# Patient Record
Sex: MALE | Race: WHITE | NOT HISPANIC OR LATINO | URBAN - METROPOLITAN AREA
[De-identification: names, ages, dates, MRNs, and addresses within clinical notes are randomized per-mention and may not be internally consistent; named-entity substitution may affect disease eponyms.]

---

## 2017-01-20 ENCOUNTER — IMPORTED ENCOUNTER (OUTPATIENT)
Dept: URBAN - METROPOLITAN AREA CLINIC 1 | Facility: CLINIC | Age: 82
End: 2017-01-20

## 2017-01-20 PROBLEM — E11.3291: Noted: 2017-01-20

## 2017-01-20 PROBLEM — Z96.1: Noted: 2017-01-20

## 2017-01-20 PROBLEM — E11.9: Noted: 2017-01-20

## 2017-01-20 PROBLEM — H01.005: Noted: 2017-01-20

## 2017-01-20 PROBLEM — H04.123: Noted: 2017-01-20

## 2017-01-20 PROBLEM — H01.002: Noted: 2017-01-20

## 2017-01-20 PROBLEM — H35.3131: Noted: 2017-01-20

## 2017-01-20 PROBLEM — D31.32: Noted: 2017-01-20

## 2017-01-20 PROBLEM — H40.1131: Noted: 2017-01-20

## 2017-01-20 PROCEDURE — 92012 INTRM OPH EXAM EST PATIENT: CPT

## 2017-01-20 NOTE — PATIENT DISCUSSION
1.  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 Patient advised to be compliant with gtts. Condition was discussed with patient and patient understands. Will continue to monitor patient for any progression in condition. Patient was advised to call us with any problems questions or concerns. 2.  Blepharitis anterior type OU- Continue daily warm compresses and lid scrubs were recommended. 3. Dry Eyes OU- Stable. The continuation of artificial tears were recommended. 4.  Pseudophakia OU- Doing well.5. H/o ARMD OU early/dry6. DM Type II with Mild Nonproliferative OD7. H/o DM Type II without DR Grady Sever.  H/o Choroidal Nevus OS9. Return for an appointment for a 30/OCT in May with Dr. Julieta Dang.

## 2017-05-08 ENCOUNTER — IMPORTED ENCOUNTER (OUTPATIENT)
Dept: URBAN - METROPOLITAN AREA CLINIC 1 | Facility: CLINIC | Age: 82
End: 2017-05-08

## 2017-05-08 PROBLEM — E11.9: Noted: 2017-05-08

## 2017-05-08 PROBLEM — D31.32: Noted: 2017-05-08

## 2017-05-08 PROBLEM — E11.3291: Noted: 2017-05-08

## 2017-05-08 PROBLEM — H35.3131: Noted: 2017-05-08

## 2017-05-08 PROBLEM — H01.001: Noted: 2017-05-08

## 2017-05-08 PROBLEM — H01.004: Noted: 2017-05-08

## 2017-05-08 PROBLEM — H04.123: Noted: 2017-05-08

## 2017-05-08 PROBLEM — H40.1131: Noted: 2017-05-08

## 2017-05-08 PROBLEM — Z79.84: Noted: 2017-05-08

## 2017-05-08 PROCEDURE — 92014 COMPRE OPH EXAM EST PT 1/>: CPT

## 2017-05-08 PROCEDURE — 92133 CPTRZD OPH DX IMG PST SGM ON: CPT

## 2017-05-08 NOTE — PATIENT DISCUSSION
1.  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 Stressed the importance of strong glucose control. Advised of importance of at least yearly dilated examinations but to contact us immediately for any problems or concerns. 2.  DM Type II (Oral Meds) without sign of diabetic retinopathy and no blot heme on dilated retinal examination today OS No Macular Edema:  Discussed the pathophysiology of diabetes and its effect on the eye and risk of blindness. Stressed the importance of strong glucose control. Advised of importance of at least yearly dilated examinations but to contact us immediately for any problems or concerns. 3. ARMD OU Early/dry/stable. Importance of daily AREDS II study multivitamin and Amsler Grid checks discussed with patient. Patient to follow-up immediately with any new onset of decreased vision and/or metamorphopsia. 4. Choroidal Nevus OS: Appears Benign and stable. Observe for changes. 5. Blepharitis anterior type OU - Continue Daily warm compresses and lid scrubs were recommended. 6. Mild Open Angle Glaucoma OU (CD 0.9 OU) IOP stable on Latanoprost QHS OU. OCT shows no progression OU. Continue Latanoprost QHS OU. Compliance with meds. 7.  Dry Eyes OU - Continue ATs BID OU routinely. Letter to PCP Return for an appointment in 6 months 10 VF 24-2 OU with Dr. Kamala Ramirez.

## 2017-11-09 ENCOUNTER — IMPORTED ENCOUNTER (OUTPATIENT)
Dept: URBAN - METROPOLITAN AREA CLINIC 1 | Facility: CLINIC | Age: 82
End: 2017-11-09

## 2017-11-09 PROBLEM — H01.002: Noted: 2017-11-09

## 2017-11-09 PROBLEM — H01.005: Noted: 2017-11-09

## 2017-11-09 PROBLEM — H16.141: Noted: 2017-11-09

## 2017-11-09 PROBLEM — H40.1131: Noted: 2017-11-09

## 2017-11-09 PROBLEM — H04.123: Noted: 2017-11-09

## 2017-11-09 PROCEDURE — 92083 EXTENDED VISUAL FIELD XM: CPT

## 2017-11-09 PROCEDURE — 92012 INTRM OPH EXAM EST PATIENT: CPT

## 2017-11-09 NOTE — PATIENT DISCUSSION
1.  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 Essentially normal OU. Patient to continue with Latanoprost OU QHS. Patient advised to be compliant with gtts. Condition was discussed with patient and patient understands. Will continue to monitor patient for any progression in condition. Patient was advised to call us with any problems questions or concerns. 2.  Dry Eyes OU w/ PEK OD- Progressed. The increase of artificial tears OU to TID were recommended. 3.  Blepharitis anterior type OU - Daily warm compresses and lid scrubs were recommended. 4. H/o DM Type II (Oral Meds) with Mild Nonproliferative Diabetic Retinopathy OD5. H/o DM Type II (Oral Meds) without DR Barton Comfort   H/o ARMD OU Early/dry7. H/o Choroidal Nevus OS8. Return for an appointment for a 30/OCT in 6 months with Dr. Kathy Hooker.

## 2018-05-11 ENCOUNTER — IMPORTED ENCOUNTER (OUTPATIENT)
Dept: URBAN - METROPOLITAN AREA CLINIC 1 | Facility: CLINIC | Age: 83
End: 2018-05-11

## 2018-05-11 PROBLEM — H01.005: Noted: 2018-05-11

## 2018-05-11 PROBLEM — D31.32: Noted: 2018-05-11

## 2018-05-11 PROBLEM — H04.123: Noted: 2018-05-11

## 2018-05-11 PROBLEM — E11.3291: Noted: 2018-05-11

## 2018-05-11 PROBLEM — H16.143: Noted: 2018-05-11

## 2018-05-11 PROBLEM — E11.9: Noted: 2018-05-11

## 2018-05-11 PROBLEM — Z79.84: Noted: 2018-05-11

## 2018-05-11 PROBLEM — Z96.1: Noted: 2018-05-11

## 2018-05-11 PROBLEM — H40.1131: Noted: 2018-05-11

## 2018-05-11 PROBLEM — H35.3131: Noted: 2018-05-11

## 2018-05-11 PROBLEM — H01.002: Noted: 2018-05-11

## 2018-05-11 PROCEDURE — 92014 COMPRE OPH EXAM EST PT 1/>: CPT

## 2018-05-11 PROCEDURE — 92133 CPTRZD OPH DX IMG PST SGM ON: CPT

## 2018-05-11 NOTE — PATIENT DISCUSSION
1.  DM Type II with Mild Nonproliferative Diabetic Retinopathy OD No Macular Edema: Stable. Discussed the pathophysiology of diabetes and its effect on the eye and risk of blindness. Stressed the importance of strong glucose control. Advised of importance of at least yearly dilated examinations but to contact us immediately for any problems or concerns. 2.  DM Type II without sign of diabetic retinopathy and no blot heme on dilated retinal examination today OS No Macular Edema: Stable. Discussed the pathophysiology of diabetes and its effect on the eye and risk of blindness. Stressed the importance of strong glucose control. Advised of importance of at least yearly dilated examinations but to contact us immediately for any problems or concerns. 3. Type II diabetes controlled by oral medications. 4.  ARMD OU early/dry/slight progression OD stable OS. Importance of daily AREDS II study multivitamin and Amsler Grid checks discussed with patient. Patient to follow-up immediately with any new onset of decreased vision and/or metamorphopsia. 5. 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 OCT looks better than clinical picture OU. Patient to continue with Latanoprost OU QHS. Patient advised to be compliant with gtts. Condition was discussed with patient and patient understands. Will continue to monitor patient for any progression in condition. Patient was advised to call us with any problems questions or concerns. 6.  Choroidal Nevus OS: Appears Benign and stable. Observe for changes. 7. SANDI w/ PEK OU- Controlled. The continuation of artificial tears were recommended. 8.  Blepharitis anterior type OU- Stable. Continue daily warm compresses and lid scrubs were recommended. 9. Pseudophakia OU- Doing well. 10.  Return for an appointment for a dfe/HVF in 6 months with Dr. Kamala Ramirez.

## 2018-05-11 NOTE — PATIENT DISCUSSION
ARMD OU early/dry/stable. Importance of daily AREDS II study multivitamin and Amsler Grid checks discussed with patient. Patient to follow-up immediately with any new onset of decreased vision and/or metamorphopsia.

## 2018-05-11 NOTE — PATIENT DISCUSSION
Mild Open Angle Glaucoma OU -Patient to continue with current gtt regimen. Patient advised to be compliant with gtts. Condition was discussed with patient and patient understands. Will continue to monitor patient for any progression in condition. Patient was advised to call us with any problems questions or concerns.

## 2018-05-11 NOTE — PATIENT DISCUSSION
DM Type II without sign of diabetic retinopathy and no blot heme on dilated retinal examination today OS No Macular Edema:  Discussed the pathophysiology of diabetes and its effect on the eye and risk of blindness. Stressed the importance of strong glucose control. Advised of importance of at least yearly dilated examinations but to contact us immediately for any problems or concerns.

## 2018-11-12 ENCOUNTER — IMPORTED ENCOUNTER (OUTPATIENT)
Dept: URBAN - METROPOLITAN AREA CLINIC 1 | Facility: CLINIC | Age: 83
End: 2018-11-12

## 2018-11-12 PROBLEM — H35.3131: Noted: 2018-11-12

## 2018-11-12 PROBLEM — E11.9: Noted: 2018-11-12

## 2018-11-12 PROBLEM — Z79.84: Noted: 2018-11-12

## 2018-11-12 PROBLEM — E11.3291: Noted: 2018-11-12

## 2018-11-12 PROBLEM — H40.1131: Noted: 2018-11-12

## 2018-11-12 PROCEDURE — 92083 EXTENDED VISUAL FIELD XM: CPT

## 2018-11-12 PROCEDURE — 92012 INTRM OPH EXAM EST PATIENT: CPT

## 2018-11-12 NOTE — PATIENT DISCUSSION
1.  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 Stressed the importance of strong glucose control. Advised of importance of at least yearly dilated examinations but to contact us immediately for any problems or concerns. 2.  DM Type II (Oral Meds) without sign of diabetic retinopathy and no blot heme on dilated retinal examination today OS No Macular Edema:  Discussed the pathophysiology of diabetes and its effect on the eye and risk of blindness. Stressed the importance of strong glucose control. Advised of importance of at least yearly dilated examinations but to contact us immediately for any problems or concerns. 3. ARMD OU Early/dry/stable. Importance of daily AREDS II study multivitamin and Amsler Grid checks discussed with patient. Patient to follow-up immediately with any new onset of decreased vision and/or metamorphopsia. 4. 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 VF shows no progression OU. Cont Latanoprost QHS OU. Compliance with meds. 5.  Choroidal Nevus OS: Appears Benign and stable. Observe for changes. 6. SANDI w/ PEK OU- Use ATs TID OU Routinely. 7.  Anterior Blepharitis OU- Continue daily warm compresses and lid scrubs were recommended. 8. Pseudophakia OU Return for an appointment in 6 mo 27 OCT with Dr. Yeni Simmons.

## 2019-05-20 ENCOUNTER — IMPORTED ENCOUNTER (OUTPATIENT)
Dept: URBAN - METROPOLITAN AREA CLINIC 1 | Facility: CLINIC | Age: 84
End: 2019-05-20

## 2019-05-20 PROBLEM — E11.3291: Noted: 2019-05-20

## 2019-05-20 PROBLEM — E11.9: Noted: 2019-05-20

## 2019-05-20 PROBLEM — Z79.84: Noted: 2019-05-20

## 2019-05-20 PROBLEM — H40.1131: Noted: 2019-05-20

## 2019-05-20 PROCEDURE — 92133 CPTRZD OPH DX IMG PST SGM ON: CPT

## 2019-05-20 PROCEDURE — 92014 COMPRE OPH EXAM EST PT 1/>: CPT

## 2019-05-20 NOTE — PATIENT DISCUSSION
1.  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 Stressed the importance of strong glucose control. Advised of importance of at least yearly dilated examinations but to contact us immediately for any problems or concerns. 2.  DM Type II (Oral Meds) without sign of diabetic retinopathy and no blot heme on dilated retinal examination today OS No Macular Edema:  Discussed the pathophysiology of diabetes and its effect on the eye and risk of blindness. Stressed the importance of strong glucose control. Advised of importance of at least yearly dilated examinations but to contact us immediately for any problems or concerns. 3. ARMD OU Early/dry/stable. Importance of daily AREDS II study multivitamin and Amsler Grid checks discussed with patient. Patient to follow-up immediately with any new onset of decreased vision and/or metamorphopsia. 4. 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 OCT shows no progression OU. Cont Latanoprost QHS OU. Compliance with meds. 5.  Choroidal Nevus OS: Appears Benign and stable. Observe for changes. 6. SANDI w/ PEK OU- Use ATs TID OU Routinely. 7.  Anterior Blepharitis OU- Continue daily warm compresses and lid scrubs were recommended. 8. Pseudophakia OU Letter to PCP MRx deferred Return for an appointment in 6 mo dfe VF 24-2 OU with Dr. Cathleen Hernandez.

## 2019-11-18 ENCOUNTER — IMPORTED ENCOUNTER (OUTPATIENT)
Dept: URBAN - METROPOLITAN AREA CLINIC 1 | Facility: CLINIC | Age: 84
End: 2019-11-18

## 2019-11-18 PROBLEM — E11.9: Noted: 2019-11-18

## 2019-11-18 PROBLEM — H40.1121: Noted: 2019-11-18

## 2019-11-18 PROBLEM — H35.3131: Noted: 2019-11-18

## 2019-11-18 PROBLEM — E11.3291: Noted: 2019-11-18

## 2019-11-18 PROBLEM — H40.1112: Noted: 2019-11-18

## 2019-11-18 PROBLEM — Z79.84: Noted: 2019-11-18

## 2019-11-18 PROCEDURE — 92012 INTRM OPH EXAM EST PATIENT: CPT

## 2019-11-18 PROCEDURE — 92083 EXTENDED VISUAL FIELD XM: CPT

## 2019-11-18 NOTE — PATIENT DISCUSSION
DM Type II with Mild Nonproliferative Diabetic Retinopathy OD No Macular Edema:  Discussed the pathophysiology of diabetes and its effect on the eye and risk of blindness. Stressed the importance of strong glucose control. Advised of importance of at least yearly dilated examinations but to contact us immediately for any problems or concerns.

## 2020-05-22 ENCOUNTER — IMPORTED ENCOUNTER (OUTPATIENT)
Dept: URBAN - METROPOLITAN AREA CLINIC 1 | Facility: CLINIC | Age: 85
End: 2020-05-22

## 2020-05-22 PROBLEM — H35.3131: Noted: 2020-05-22

## 2020-05-22 PROBLEM — H40.1112: Noted: 2020-05-22

## 2020-05-22 PROBLEM — Z79.84: Noted: 2020-05-22

## 2020-05-22 PROBLEM — H40.1121: Noted: 2020-05-22

## 2020-05-22 PROBLEM — E11.3291: Noted: 2020-05-22

## 2020-05-22 PROCEDURE — 92133 CPTRZD OPH DX IMG PST SGM ON: CPT

## 2020-05-22 PROCEDURE — 92014 COMPRE OPH EXAM EST PT 1/>: CPT

## 2021-02-12 ENCOUNTER — IMPORTED ENCOUNTER (OUTPATIENT)
Dept: URBAN - METROPOLITAN AREA CLINIC 1 | Facility: CLINIC | Age: 86
End: 2021-02-12

## 2021-02-12 PROBLEM — H04.123: Noted: 2021-02-12

## 2021-02-12 PROBLEM — Z79.84: Noted: 2021-02-12

## 2021-02-12 PROBLEM — E11.9: Noted: 2021-02-12

## 2021-02-12 PROBLEM — H35.3131: Noted: 2021-02-12

## 2021-02-12 PROBLEM — H40.1132: Noted: 2021-02-12

## 2021-02-12 PROBLEM — Z96.1: Noted: 2021-02-12

## 2021-02-12 PROBLEM — H01.001: Noted: 2021-02-12

## 2021-02-12 PROBLEM — H01.004: Noted: 2021-02-12

## 2021-02-12 PROBLEM — E11.3291: Noted: 2021-02-12

## 2021-02-12 PROBLEM — D31.32: Noted: 2021-02-12

## 2021-02-12 PROBLEM — H43.393: Noted: 2021-02-12

## 2021-02-12 PROBLEM — H16.143: Noted: 2021-02-12

## 2021-02-12 PROCEDURE — 92083 EXTENDED VISUAL FIELD XM: CPT

## 2021-02-12 PROCEDURE — 92014 COMPRE OPH EXAM EST PT 1/>: CPT

## 2021-02-12 NOTE — PATIENT DISCUSSION
1.  DM Type II with Mild Nonproliferative Diabetic Retinopathy OD No Macular Edema:  Discussed the pathophysiology of diabetes and its effect on the eye and risk of blindness. Stressed the importance of strong glucose control. Advised of importance of at least yearly dilated examinations but to contact us immediately for any problems or concerns. 2.  DM Type II without sign of diabetic retinopathy and no blot heme on dilated retinal examination today OS No Macular Edema:  Discussed the pathophysiology of diabetes and its effect on the eye and risk of blindness. Stressed the importance of strong glucose control. Advised of importance of at least yearly dilated examinations but to contact us immediately for any problems or concerns. 3. ARMD OU early/dry/stable. Importance of daily AREDS II study multivitamin and Amsler Grid checks discussed with patient. Patient to follow-up immediately with any new onset of decreased vision and/or metamorphopsia. 4. Moderate Open Angle Glaucoma OU (0.9/0.9) -Increased IOP. Slight progression by HVF. Pt to continue with Latanoprost  Condition was discussed with patient and patient understands. Will continue to monitor patient for any progression in condition. Patient was advised to call us with any problems questions or concerns. 5.  Choroidal Nevus OS: Appears Benign and stable. Observe for changes. 6. SANDI w/ PEK OU-The use/continuation of artificial tears were recommended. 7.  Floaters OU-Stable8. Anterior Blepharitis OU - Daily Hot compresses and lid scrubs were recommended. 9. Pseudophakia OU - 10. Return for an appointment for 6 months for a 30/ OCT with Dr. Regan Carranza.

## 2021-02-12 NOTE — PATIENT DISCUSSION
Moderate Open Angle Glaucoma OU (0.9/0.9) -Increased IOP. Slight progression by HVF. Pt to continue with Latanoprost  Condition was discussed with patient and patient understands. Will continue to monitor patient for any progression in condition. Patient was advised to call us with any problems questions or concerns.

## 2021-08-23 ENCOUNTER — IMPORTED ENCOUNTER (OUTPATIENT)
Dept: URBAN - METROPOLITAN AREA CLINIC 1 | Facility: CLINIC | Age: 86
End: 2021-08-23

## 2021-08-23 PROBLEM — H40.1132: Noted: 2021-08-23

## 2021-08-23 PROBLEM — H35.3131: Noted: 2021-08-23

## 2021-08-23 PROBLEM — H35.3132: Noted: 2021-08-23

## 2021-08-23 PROBLEM — E11.9: Noted: 2021-08-23

## 2021-08-23 PROBLEM — E11.3291: Noted: 2021-08-23

## 2021-08-23 PROBLEM — Z79.84: Noted: 2021-08-23

## 2021-08-23 PROCEDURE — 99214 OFFICE O/P EST MOD 30 MIN: CPT

## 2021-08-23 PROCEDURE — 92133 CPTRZD OPH DX IMG PST SGM ON: CPT

## 2021-08-23 NOTE — PATIENT DISCUSSION
1.  DM Type II (Oral Med) with Mild Nonproliferative Diabetic Retinopathy OD No Macular Edema:  Discussed the pathophysiology of diabetes and its effect on the eye and risk of blindness. Stressed the importance of strong glucose control. Advised of importance of at least yearly dilated examinations but to contact us immediately for any problems or concerns. 2.  DM Type II (Oral Med) without sign of diabetic retinopathy and no blot heme on dilated retinal examination today OS No Macular Edema:  Discussed the pathophysiology of diabetes and its effect on the eye and risk of blindness. Stressed the importance of strong glucose control. Advised of importance of at least yearly dilated examinations but to contact us immediately for any problems or concerns. 3. ARMD OU intermediate/dry/slight progression OU. Importance of daily AREDS II study multivitamin and Amsler Grid checks discussed with patient. Patient to follow-up immediately with any new onset of decreased vision and/or metamorphopsia. 4. Moderate Open Angle Glaucoma OU (0.9/0.9) -- IOP improved OU today. No progression by OCT. Pt to continue with Latanoprost QHS OU. Condition was discussed with patient and patient understands. Will continue to monitor patient for any progression in condition. Patient was advised to call us with any problems questions or concerns. 5.  Choroidal Nevus OS -- Appears Benign and stable. Observe for changes. 6. SANDI w/ PEK OU -- The use/continuation of artificial tears were recommended. 7.  Floaters OU -- Stable8. Anterior Blepharitis OU -- Daily Hot compresses and lid scrubs were recommended. 9. Pseudophakia OU  10. Return for an appointment for 6 months for a DFE/HVF 24-2 with Dr. Chetan Lopez.

## 2022-03-12 ASSESSMENT — TONOMETRY
OS_IOP_MMHG: 15
OS_IOP_MMHG: 13
OS_IOP_MMHG: 13
OD_IOP_MMHG: 21
OD_IOP_MMHG: 16
OD_IOP_MMHG: 17
OD_IOP_MMHG: 15
OS_IOP_MMHG: 14
OD_IOP_MMHG: 14
OD_IOP_MMHG: 18
OS_IOP_MMHG: 14
OS_IOP_MMHG: 17
OD_IOP_MMHG: 14
OD_IOP_MMHG: 15
OS_IOP_MMHG: 14
OS_IOP_MMHG: 17
OD_IOP_MMHG: 17
OS_IOP_MMHG: 19
OS_IOP_MMHG: 16
OD_IOP_MMHG: 17

## 2022-03-12 ASSESSMENT — VISUAL ACUITY
OS_CC: 20/25
OD_CC: 20/25-1
OD_CC: 20/25+1
OD_CC: 20/30-1
OS_CC: 20/25
OD_CC: 20/40-1
OD_SC: 20/20
OD_CC: 20/25-2
OD_CC: 20/25-1
OD_CC: 20/25
OS_CC: 20/25-1
OS_CC: 20/25
OD_CC: 20/25
OS_CC: 20/30
OS_CC: 20/25
OS_CC: 20/25
OS_CC: 20/25-1
OD_CC: 20/25-1
OS_CC: 20/30-1
OS_SC: 20/20-1
OD_CC: 20/25
OS_CC: 20/40+1

## 2022-04-04 ENCOUNTER — EMERGENCY VISIT (OUTPATIENT)
Dept: URBAN - METROPOLITAN AREA CLINIC 1 | Facility: CLINIC | Age: 87
End: 2022-04-04

## 2022-04-04 DIAGNOSIS — E11.3291: ICD-10-CM

## 2022-04-04 DIAGNOSIS — H35.3221: ICD-10-CM

## 2022-04-04 DIAGNOSIS — H35.3114: ICD-10-CM

## 2022-04-04 PROCEDURE — 92012 INTRM OPH EXAM EST PATIENT: CPT

## 2022-04-04 ASSESSMENT — TONOMETRY
OD_IOP_MMHG: 14
OS_IOP_MMHG: 14

## 2022-04-04 ASSESSMENT — VISUAL ACUITY
OS_PH: 20/30
OS_SC: 20/60
OD_SC: 20/30

## 2022-04-04 NOTE — PATIENT DISCUSSION
(CD 0.90 OU) - IOP stable, continue Latanoprost QHS OU. Patient advised to be compliant with gtts. Condition was discussed with patient and patient understands. Will continue to monitor patient for any progression in condition. Patient was advised to call us with any problems, questions, or concerns.

## 2023-09-20 NOTE — PATIENT DISCUSSION
1.  Moderate Open Angle Glaucoma OD/Mild Open Angle Glaucoma OS -- (0.9 OU) HVF today early superior arc OD WNL OS. IOP slight elevated OU despite compliance. Cont Latanoprost QHS OU. ***Consider changing gtt therapy with any future progression. Compliance with meds discussed. 2.  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 Stressed the importance of strong glucose control. Advised of importance of at least yearly dilated examinations but to contact us immediately for any problems or concerns. 3.  DM Type II (Oral Meds) without sign of diabetic retinopathy and no blot heme on dilated retinal examination today OS No Macular Edema:  Discussed the pathophysiology of diabetes and its effect on the eye and risk of blindness. Stressed the importance of strong glucose control. Advised of importance of at least yearly dilated examinations but to contact us immediately for any problems or concerns. 4. ARMD OU Early/dry/stable. Importance of daily AREDS II study multivitamin and Amsler Grid checks discussed with patient. Patient to follow-up immediately with any new onset of decreased vision and/or metamorphopsia. 5. Choroidal Nevus OS: Appears Benign and stable. Observe for changes. 6. SANDI w/ PEK OU- Use ATs TID OU Routinely. 7.  Anterior Blepharitis OU- Continue daily warm compresses and lid scrubs were recommended. 8. Pseudophakia OU Return for an appointment in 6 months for a 30/OCT with Dr. Dawna Singleton. Tetracycline Counseling: Patient counseled regarding possible photosensitivity and increased risk for sunburn.  Patient instructed to avoid sunlight, if possible.  When exposed to sunlight, patients should wear protective clothing, sunglasses, and sunscreen.  The patient was instructed to call the office immediately if the following severe adverse effects occur:  hearing changes, easy bruising/bleeding, severe headache, or vision changes.  The patient verbalized understanding of the proper use and possible adverse effects of tetracycline.  All of the patient's questions and concerns were addressed. Patient understands to avoid pregnancy while on therapy due to potential birth defects.

## 2023-11-05 NOTE — PATIENT DISCUSSION
1.  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 Stressed the importance of strong glucose control. Advised of importance of at least yearly dilated examinations but to contact us immediately for any problems or concerns. 2.  DM Type II (Oral Meds) without sign of diabetic retinopathy and no blot heme on dilated retinal examination today OS No Macular Edema:  Discussed the pathophysiology of diabetes and its effect on the eye and risk of blindness. Stressed the importance of strong glucose control. Advised of importance of at least yearly dilated examinations but to contact us immediately for any problems or concerns. 3. ARMD OU early/dry/stable. Importance of daily AREDS II study multivitamin and Amsler Grid checks discussed with patient. Patient to follow-up immediately with any new onset of decreased vision and/or metamorphopsia. 4. Moderate Open Angle Glaucoma OD/Mild Open Angle Glaucoma OS -- (0.9 OU) Slight progression by OCT (clinical findings more advanced than OCT). IOP reamins slight elevated OU despite compliance. Cont Latanoprost QHS OU. ***Consider changing gtt therapy with any future progression. Compliance with meds discussed. 5. Vitreous Floaters OU -- Benign. Condition discussed and Reassurance given. 6. Choroidal Nevus OS: Appears Benign and stable. Observe for changes. 7. SANDI w/ PEK OU- Use ATs TID OU Routinely. 8.  Anterior Blepharitis OU- Continue daily warm compresses and lid scrubs were recommended. 9. Pseudophakia OU Return for an appointment in 6 months for a 10/DFE/HVF with Dr. Yeni Simmons.
ARMD OU early/dry/stable. Importance of daily AREDS II study multivitamin and Amsler Grid checks discussed with patient. Patient to follow-up immediately with any new onset of decreased vision and/or metamorphopsia.
DM Type II without sign of diabetic retinopathy and no blot heme on dilated retinal examination today OS No Macular Edema:  Discussed the pathophysiology of diabetes and its effect on the eye and risk of blindness. Stressed the importance of strong glucose control. Advised of importance of at least yearly dilated examinations but to contact us immediately for any problems or concerns.
Mild Open Angle Glaucoma OS -Patient to continue with current gtt regimen. Patient advised to be compliant with gtts. Condition was discussed with patient and patient understands. Will continue to monitor patient for any progression in condition. Patient was advised to call us with any problems questions or concerns.
Moderate Open Angle Glaucoma OD -Patient to continue with current gtt regimen. Patient advised to be compliant with gtts. Condition was discussed with patient and patient understands. Will continue to monitor patient for any progression in condition. Patient was advised to call us with any problems questions or concerns.
Type II diabetes controlled by oral medications.
Home

## 2024-12-02 ENCOUNTER — COMPREHENSIVE EXAM (OUTPATIENT)
Age: 89
End: 2024-12-02

## 2024-12-02 DIAGNOSIS — H40.1133: ICD-10-CM

## 2024-12-02 DIAGNOSIS — E11.3291: ICD-10-CM

## 2024-12-02 DIAGNOSIS — H35.3221: ICD-10-CM

## 2024-12-02 DIAGNOSIS — H35.3114: ICD-10-CM

## 2024-12-02 PROCEDURE — 92133 CPTRZD OPH DX IMG PST SGM ON: CPT

## 2024-12-02 PROCEDURE — 99214 OFFICE O/P EST MOD 30 MIN: CPT

## 2025-04-21 ENCOUNTER — FOLLOW UP (OUTPATIENT)
Age: OVER 89
End: 2025-04-21

## 2025-04-21 DIAGNOSIS — H35.3221: ICD-10-CM

## 2025-04-21 DIAGNOSIS — E11.3291: ICD-10-CM

## 2025-04-21 DIAGNOSIS — H04.123: ICD-10-CM

## 2025-04-21 DIAGNOSIS — H35.3114: ICD-10-CM

## 2025-04-21 DIAGNOSIS — H40.1133: ICD-10-CM

## 2025-04-21 DIAGNOSIS — Z96.1: ICD-10-CM

## 2025-04-21 PROCEDURE — 92015 DETERMINE REFRACTIVE STATE: CPT

## 2025-04-21 PROCEDURE — 99213 OFFICE O/P EST LOW 20 MIN: CPT
